# Patient Record
Sex: MALE | Race: WHITE | NOT HISPANIC OR LATINO | Employment: FULL TIME | URBAN - METROPOLITAN AREA
[De-identification: names, ages, dates, MRNs, and addresses within clinical notes are randomized per-mention and may not be internally consistent; named-entity substitution may affect disease eponyms.]

---

## 2019-03-22 ENCOUNTER — TELEPHONE (OUTPATIENT)
Dept: FAMILY MEDICINE CLINIC | Facility: CLINIC | Age: 39
End: 2019-03-22

## 2019-03-22 NOTE — TELEPHONE ENCOUNTER
PT. SPOUSE DIAMOND LEAL IS YOUR PATIENT. WOULD LIKE TO SEE IF YOU WILL SEE HIM. HAS NOT HAD A PCP IN YEARS.

## 2019-04-30 ENCOUNTER — HOSPITAL ENCOUNTER (OUTPATIENT)
Dept: GENERAL RADIOLOGY | Facility: HOSPITAL | Age: 39
Discharge: HOME | End: 2019-04-30
Admitting: FAMILY MEDICINE

## 2019-04-30 ENCOUNTER — OFFICE VISIT (OUTPATIENT)
Dept: FAMILY MEDICINE CLINIC | Facility: CLINIC | Age: 39
End: 2019-04-30

## 2019-04-30 VITALS
SYSTOLIC BLOOD PRESSURE: 116 MMHG | WEIGHT: 178 LBS | BODY MASS INDEX: 26.98 KG/M2 | OXYGEN SATURATION: 96 % | HEART RATE: 81 BPM | DIASTOLIC BLOOD PRESSURE: 82 MMHG | HEIGHT: 68 IN

## 2019-04-30 DIAGNOSIS — Z00.00 ROUTINE ADULT HEALTH MAINTENANCE: Primary | ICD-10-CM

## 2019-04-30 DIAGNOSIS — M54.50 CHRONIC MIDLINE LOW BACK PAIN WITHOUT SCIATICA: ICD-10-CM

## 2019-04-30 DIAGNOSIS — R51.9 BILATERAL HEADACHES: ICD-10-CM

## 2019-04-30 DIAGNOSIS — M25.511 CHRONIC RIGHT SHOULDER PAIN: ICD-10-CM

## 2019-04-30 DIAGNOSIS — G89.29 CHRONIC RIGHT SHOULDER PAIN: ICD-10-CM

## 2019-04-30 DIAGNOSIS — G89.29 CHRONIC MIDLINE LOW BACK PAIN WITHOUT SCIATICA: ICD-10-CM

## 2019-04-30 PROCEDURE — 72110 X-RAY EXAM L-2 SPINE 4/>VWS: CPT

## 2019-04-30 PROCEDURE — 99204 OFFICE O/P NEW MOD 45 MIN: CPT | Performed by: FAMILY MEDICINE

## 2019-04-30 RX ORDER — PROMETHAZINE HYDROCHLORIDE 25 MG/1
25 TABLET ORAL EVERY 6 HOURS PRN
Qty: 30 TABLET | Refills: 0 | Status: SHIPPED | OUTPATIENT
Start: 2019-04-30

## 2019-04-30 RX ORDER — IBUPROFEN 800 MG/1
800 TABLET ORAL 3 TIMES DAILY PRN
Qty: 90 TABLET | Refills: 0 | Status: SHIPPED | OUTPATIENT
Start: 2019-04-30 | End: 2019-05-30

## 2019-04-30 RX ORDER — TIZANIDINE HYDROCHLORIDE 4 MG/1
4 CAPSULE, GELATIN COATED ORAL 3 TIMES DAILY PRN
Qty: 30 CAPSULE | Refills: 1 | Status: SHIPPED | OUTPATIENT
Start: 2019-04-30 | End: 2019-05-24 | Stop reason: SDUPTHER

## 2019-04-30 RX ORDER — BUTALBITAL, ACETAMINOPHEN AND CAFFEINE 50; 325; 40 MG/1; MG/1; MG/1
1 TABLET ORAL EVERY 4 HOURS PRN
Qty: 30 TABLET | Refills: 0 | Status: SHIPPED | OUTPATIENT
Start: 2019-04-30

## 2019-04-30 NOTE — PROGRESS NOTES
Chief Complaint   Patient presents with   • Back Pain   • Headache   • Shoulder Pain       Subjective   Kendall Cerda is a 38 y.o. male and is here for a yearly physical exam. The patient reports problems - new patient. his wife is a patient of mine.    Shoulder Pain: Patient complaints of right shoulder pain. This is evaluated as a personal injury. The pain is described as aching.  The onset of the pain was gradual, starting about several months ago.  The pain occurs when active and lasts 1 hour.  Location is global. No history of dislocation. Symptoms are aggravated by ADL's, work at or above shoulder height. Symptoms are diminished by  rest.   Limited activities include: no limitations. moderate stiffness, no weakness, no swelling, no crepitus noted is reported. 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  Is a self employed [employer] and is right handed. No specific injury.    Low back also hurts most days of the week.    Also new headaches 1 a month or so         Do you take any herbs or supplements that were not prescribed by a doctor? yes. If so, these will be added to active medication list.    The following portions of the patient's history were reviewed and updated as appropriate: allergies, current medications, past family history, past medical history, past social history, past surgical history and problem list.    Social and Family and Surgical History reviewed and updated today, see Rooming tab.    Health History, Preventive Measures and Vaccination flow sheets reviewed and updated today.    Patient's current medical chart in Epic; including previous office notes, imaging, labs, specialist's evaluation either in notes or in Media tab reviewed today.    Other pertinent medical information also reviewed thru Care Everywhere function is also reviewed today.    Review of Systems  Review of Systems   Constitutional: Negative for fatigue and fever.   HENT: Negative for congestion, ear  "pain, sinus pressure and tinnitus.    Eyes: Negative for visual disturbance.   Respiratory: Negative.    Cardiovascular: Negative.    Gastrointestinal: Negative.    Genitourinary: Negative.    Musculoskeletal: Positive for back pain.   Neurological: Positive for headaches.     A comprehensive review of systems was negative except for: Musculoskeletal: positive for back pain and stiff joints  Neurological: positive for headaches    Vitals:    04/30/19 0959   BP: 116/82   Pulse: 81   SpO2: 96%   Weight: 80.7 kg (178 lb)   Height: 171.5 cm (67.5\")       General Appearance:  Alert, cooperative, no distress, appears stated age   Head:  Normocephalic, without obvious abnormality, atraumatic   Eyes:  PERRL, conjunctiva/corneas clear, EOM's intact.   Ears:  Normal TM's and external ear canals, both ears   Nose: Nares normal, septum midline, mucosa normal, no drainage or sinus tenderness   Throat: Rotten left upper tooth, will see dentist soon to pull tooth   Neck: Supple, symmetrical, trachea midline, no adenopathy;   thyroid: No enlargement/tenderness/nodules; no carotid  bruit   Back:  Symmetric, no curvature, ROM normal, no CVA tenderness   Lungs:  Clear to auscultation bilaterally, respirations unlabored   Chest wall:  No tenderness or deformity   Heart:  Regular rate and rhythm, S1 and S2 normal, no murmur, rub or gallop   Abdomen:     Rectal:        Extremities: Extremities normal, atraumatic, no cyanosis or edema   Pulses: 2+ and symmetric all extremities   Skin: Skin color, texture, turgor normal, no rashes or lesions   Lymph nodes: Cervical, supraclavicular, and axillary nodes normal   Neurologic: CNII-XII intact. Normal strength, sensation and reflexes   throughout        No results found for this or any previous visit.  Assessment/Plan   Healthy male exam.  Kendall was seen today for back pain, headache and shoulder pain.    Diagnoses and all orders for this visit:    Routine adult health maintenance    Chronic " right shoulder pain  -     ibuprofen (ADVIL,MOTRIN) 800 MG tablet; Take 1 tablet by mouth 3 (Three) Times a Day As Needed for Mild Pain  for up to 30 days.    Chronic midline low back pain without sciatica  -     ibuprofen (ADVIL,MOTRIN) 800 MG tablet; Take 1 tablet by mouth 3 (Three) Times a Day As Needed for Mild Pain  for up to 30 days.  -     TiZANidine (ZANAFLEX) 4 MG capsule; Take 1 capsule by mouth 3 (Three) Times a Day As Needed for Muscle Spasms.  -     XR Spine Lumbar 4+ View    Bilateral headaches  -     ibuprofen (ADVIL,MOTRIN) 800 MG tablet; Take 1 tablet by mouth 3 (Three) Times a Day As Needed for Mild Pain  for up to 30 days.  -     promethazine (PHENERGAN) 25 MG tablet; Take 1 tablet by mouth Every 6 (Six) Hours As Needed for Nausea or Vomiting.  -     butalbital-acetaminophen-caffeine (ESGIC) -40 MG per tablet; Take 1 tablet by mouth Every 4 (Four) Hours As Needed for Headache.      1. Obtain lumbar x rays  2. Patient Counseling:  --Nutrition: Stressed importance of moderation in sodium/caffeine intake, saturated fat and cholesterol.  Discussed caloric balance, sufficient intake of fresh fruits, vegetables, fiber, calcium, iron.  ----Exercise: Stressed the importance of regular exercise.   --Substance Abuse: Discussed cessation/primary prevention of tobacco, alcohol, or other drug use; driving or other dangerous activities under the influence. No plans on quitting smoking at this time.     --Dental health: Discussed importance of regular tooth brushing, flossing, and dental visits.  -- suggested having eyes and vision checked if needed or past due.  --Immunizations reviewed.  --3. Discussed the patient's BMI with him.  The BMI is in the acceptable range  4. Follow up as needed for acute illness    There are no Patient Instructions on file for this visit.    There are no discontinued medications.     Dr. Robert Rios MD  [place], [place]  [facility]

## 2019-05-24 ENCOUNTER — PATIENT MESSAGE (OUTPATIENT)
Dept: FAMILY MEDICINE CLINIC | Facility: CLINIC | Age: 39
End: 2019-05-24

## 2019-05-24 DIAGNOSIS — M54.50 CHRONIC MIDLINE LOW BACK PAIN WITHOUT SCIATICA: ICD-10-CM

## 2019-05-24 DIAGNOSIS — G89.29 CHRONIC MIDLINE LOW BACK PAIN WITHOUT SCIATICA: ICD-10-CM

## 2019-05-24 RX ORDER — TIZANIDINE HYDROCHLORIDE 4 MG/1
4 CAPSULE, GELATIN COATED ORAL 3 TIMES DAILY PRN
Qty: 90 CAPSULE | Refills: 5 | Status: SHIPPED | OUTPATIENT
Start: 2019-05-24

## 2019-05-24 NOTE — TELEPHONE ENCOUNTER
From: Kendall Cerda  To: Robert Rios MD  Sent: 5/24/2019 1:34 PM EDT  Subject: Prescription Question    The TiZANidine 4 MG capsule is really helping for shoulder pain as long as it is taken multiple times every day. Once dose is skipped pain comes back. I am now out of refills. Can we refill at this time or change the MG to last longer?